# Patient Record
Sex: MALE | Race: WHITE
[De-identification: names, ages, dates, MRNs, and addresses within clinical notes are randomized per-mention and may not be internally consistent; named-entity substitution may affect disease eponyms.]

---

## 2017-07-11 ENCOUNTER — HOSPITAL ENCOUNTER (OUTPATIENT)
Dept: HOSPITAL 83 - RAD | Age: 42
Discharge: HOME | End: 2017-07-11
Attending: INTERNAL MEDICINE
Payer: COMMERCIAL

## 2017-07-11 DIAGNOSIS — M54.41: Primary | ICD-10-CM

## 2017-09-19 ENCOUNTER — HOSPITAL ENCOUNTER (OUTPATIENT)
Dept: HOSPITAL 83 - CARD | Age: 42
Discharge: HOME | End: 2017-09-19
Attending: INTERNAL MEDICINE
Payer: COMMERCIAL

## 2017-09-19 DIAGNOSIS — R07.2: Primary | ICD-10-CM

## 2017-09-19 NOTE — NUR
INFORMED CONSENT SIGNED FOR LEXISCAN STRESS TEST WITH DR. ALVES.  RESTING EKG
SINUS BRADYCARDIA, HR 58, /80.  PULSE OX 98% AND LUNGS CLEAR.  COMPLETED
ONE MINUTE OF LEXISCAN PROTOCOL RECEIVING LEXISCAN 0.4MG OVER 10 SECONDS.  NO
ARRHYTHMIAS NOTED.  INVERTED T-WAVE V1-V5.  PT C/O SOB.  LAST RECOVERY HR 81,
/66.  WAITING NUCLEAR SCANNING IN STABLE CONDITION.

## 2020-11-11 ENCOUNTER — HOSPITAL ENCOUNTER (EMERGENCY)
Dept: HOSPITAL 83 - ED | Age: 45
Discharge: HOME | End: 2020-11-11
Payer: COMMERCIAL

## 2020-11-11 VITALS — BODY MASS INDEX: 37.93 KG/M2 | HEIGHT: 71.97 IN | WEIGHT: 280 LBS

## 2020-11-11 DIAGNOSIS — Z79.899: ICD-10-CM

## 2020-11-11 DIAGNOSIS — F43.9: Primary | ICD-10-CM
